# Patient Record
Sex: MALE | Race: WHITE | URBAN - METROPOLITAN AREA
[De-identification: names, ages, dates, MRNs, and addresses within clinical notes are randomized per-mention and may not be internally consistent; named-entity substitution may affect disease eponyms.]

---

## 2017-01-05 ENCOUNTER — OFFICE VISIT (OUTPATIENT)
Dept: FAMILY MEDICINE | Facility: CLINIC | Age: 27
End: 2017-01-05
Payer: COMMERCIAL

## 2017-01-05 VITALS
WEIGHT: 171 LBS | HEIGHT: 72 IN | TEMPERATURE: 98.3 F | BODY MASS INDEX: 23.16 KG/M2 | HEART RATE: 73 BPM | OXYGEN SATURATION: 98 % | DIASTOLIC BLOOD PRESSURE: 77 MMHG | SYSTOLIC BLOOD PRESSURE: 124 MMHG

## 2017-01-05 DIAGNOSIS — F41.8 DEPRESSION WITH ANXIETY: Primary | ICD-10-CM

## 2017-01-05 DIAGNOSIS — R41.840 ATTENTION DEFICIT: ICD-10-CM

## 2017-01-05 PROCEDURE — 99207 ZZC FOR CODING REVIEW: CPT | Performed by: INTERNAL MEDICINE

## 2017-01-05 ASSESSMENT — ENCOUNTER SYMPTOMS
HALLUCINATIONS: 0
NERVOUS/ANXIOUS: 1
WEIGHT LOSS: 0
PALPITATIONS: 0
DEPRESSION: 1

## 2017-01-05 ASSESSMENT — LIFESTYLE VARIABLES: SUBSTANCE_ABUSE: 0

## 2017-01-05 NOTE — NURSING NOTE
Chief Complaint   Patient presents with     Establish Care     pt wants a referral for neuropsych       Initial /77 mmHg  Pulse 73  Temp(Src) 98.3  F (36.8  C) (Oral)  Ht 6' (1.829 m)  Wt 171 lb (77.565 kg)  BMI 23.19 kg/m2  SpO2 98% Estimated body mass index is 23.19 kg/(m^2) as calculated from the following:    Height as of this encounter: 6' (1.829 m).    Weight as of this encounter: 171 lb (77.565 kg).  BP completed using cuff size: large, left arm.  Mary Beyer MA

## 2017-01-05 NOTE — MR AVS SNAPSHOT
After Visit Summary   1/5/2017    Tono Bui    MRN: [id number]           Patient Information     Date Of Birth          1990        Visit Information        Provider Department      1/5/2017 4:00 PM Satya Nath MD [facility]        Today's Diagnoses     Depression with anxiety    -  1     Attention deficit           Care Instructions    Proceed with neuro-psychiatric testing.    Seek medical attention if your symptoms persist/worsens, or if you develop new symptoms.          Follow-ups after your visit        Additional Services     MENTAL HEALTH REFERRAL       Your provider has referred you to: [facility]: Psychiatry Clinic [facility] [phone]   [email or web address]    All scheduling is subject to the client's specific insurance plan & benefits, provider/location availability, and provider clinical specialities.  Please arrive 15 minutes early for your first appointment and bring your completed paperwork.    Please be aware that coverage of these services is subject to the terms and limitations of your health insurance plan.  Call member services at your health plan with any benefit or coverage questions.            MENTAL HEALTH REFERRAL       Your provider has referred you to:     All scheduling is subject to the client's specific insurance plan & benefits, provider/location availability, and provider clinical specialities.  Please arrive 15 minutes early for your first appointment and bring your completed paperwork.    Please be aware that coverage of these services is subject to the terms and limitations of your health insurance plan.  Call member services at your health plan with any benefit or coverage questions.                  Who to contact     If you have questions or need follow up information about today's clinic visit or your schedule please contact [facility] directly at [phone].  Normal  "or non-critical lab and imaging results will be communicated to you by [employer], letter or phone within 4 business days after the clinic has received the results. If you do not hear from us within 7 days, please contact the clinic through [employer] or phone. If you have a critical or abnormal lab result, we will notify you by phone as soon as possible.  Submit refill requests through [facility] or call your pharmacy and they will forward the refill request to us. Please allow 3 business days for your refill to be completed.          Additional Information About Your Visit        [employer] Information     [facility] lets you send messages to your doctor, view your test results, renew your prescriptions, schedule appointments and more. To sign up, go to [email or web address] . Click on \"Log in\" on the left side of the screen, which will take you to the Welcome page. Then click on \"Sign up Now\" on the right side of the page.     You will be asked to enter the access code listed below, as well as some personal information. Please follow the directions to create your username and password.     Your access code is: [email or web address]  Expires: 2017  4:57 PM     Your access code will  in 90 days. If you need help or a new code, please call your [place] clinic or [phone].        Care EveryWhere ID     This is your Care EveryWhere ID. This could be used by other organizations to access your [place] medical records  [id number]        Your Vitals Were     Pulse Temperature Height BMI (Body Mass Index) Pulse Oximetry       73 98.3  F (36.8  C) (Oral) 6' (1.829 m) 23.19 kg/m2 98%        Blood Pressure from Last 3 Encounters:   17 124/77   16 140/60    Weight from Last 3 Encounters:   17 171 lb (77.565 kg)   16 160 lb (72.576 kg)              We Performed the Following     MENTAL HEALTH REFERRAL     MENTAL HEALTH REFERRAL        Primary Care Provider    None       No address on file        Thank you!  "    Thank you for choosing [facility]  for your care. Our goal is always to provide you with excellent care. Hearing back from our patients is one way we can continue to improve our services. Please take a few minutes to complete the written survey that you may receive in the mail after your visit with us. Thank you!             Your Updated Medication List - Protect others around you: Learn how to safely use, store and throw away your medicines at www.disposemymeds.org.      Notice  As of 1/5/2017  4:57 PM    You have not been prescribed any medications.

## 2017-01-05 NOTE — PATIENT INSTRUCTIONS
Proceed with neuro-psychiatric testing.    Seek medical attention if your symptoms persist/worsens, or if you develop new symptoms.

## 2017-01-06 NOTE — PROGRESS NOTES
HPI Comments:   Patient and his father (who came along with the patient during today's visit) are concerned about depression, anxiety, and attention deficit disorder and are requesting neuropsychological testing.    For the past several months, the patient would experience feelings of depression and anxiety. Denies suicidal ideation. He has not been diagnosed/treated for these conditions before.  The symptoms reportedly appeared to be more prominent after he had a cerebral concussion back in September of last year. No residual neurological focal deficits.    As for the attention deficit issue, patient said that he gets bored easily and isn't able to sustain his interest in a particular activity.  Has not been diagnosed/treated for this condition before.    Review of labs showed that a urine toxicology screen back in September of last year was positive for marijuana.    No other subjective complaints presented.      History reviewed. No pertinent past medical history.      Review of Systems   Constitutional: Negative for weight loss and malaise/fatigue.   Cardiovascular: Negative for chest pain and palpitations.   Psychiatric/Behavioral: Positive for depression. Negative for suicidal ideas, hallucinations and substance abuse. The patient is nervous/anxious.        /77 mmHg  Pulse 73  Temp(Src) 98.3  F (36.8  C) (Oral)  Ht 6' (1.829 m)  Wt 171 lb (77.565 kg)  BMI 23.19 kg/m2  SpO2 98%      Physical Exam   Constitutional: He is oriented to person, place, and time. No distress.   Neck: No thyromegaly present.   Cardiovascular: Normal rate, regular rhythm and normal heart sounds.    Neurological: He is alert and oriented to person, place, and time. Coordination normal. GCS score is 15.   Psychiatric: Mood and affect normal.   Vitals reviewed.          ICD-10-CM    1. Depression with anxiety F41.8 MENTAL HEALTH REFERRAL     MENTAL HEALTH REFERRAL   2. Attention deficit R41.840 MENTAL HEALTH REFERRAL     MENTAL  HEALTH REFERRAL       Patient Instructions   Proceed with neuro-psychiatric testing.    Seek medical attention if your symptoms persist/worsens, or if you develop new symptoms.

## 2017-03-17 PROBLEM — F33.1 MODERATE EPISODE OF RECURRENT MAJOR DEPRESSIVE DISORDER (H): Status: ACTIVE | Noted: 2017-03-17
